# Patient Record
Sex: FEMALE | Race: WHITE | NOT HISPANIC OR LATINO | Employment: UNEMPLOYED | URBAN - METROPOLITAN AREA
[De-identification: names, ages, dates, MRNs, and addresses within clinical notes are randomized per-mention and may not be internally consistent; named-entity substitution may affect disease eponyms.]

---

## 2023-03-08 PROBLEM — K20.0 EOSINOPHILIC ESOPHAGITIS DUE TO FOOD: Status: ACTIVE | Noted: 2023-03-08

## 2023-03-08 PROBLEM — L30.9 ECZEMA: Status: ACTIVE | Noted: 2023-03-08

## 2023-03-08 PROBLEM — N94.6 DYSMENORRHEA: Status: ACTIVE | Noted: 2023-03-08

## 2023-03-08 PROBLEM — F41.8 DEPRESSION WITH ANXIETY: Status: ACTIVE | Noted: 2023-03-08

## 2023-03-08 PROBLEM — J45.20 MILD INTERMITTENT ASTHMA WITHOUT COMPLICATION (HHS-HCC): Status: ACTIVE | Noted: 2023-03-08

## 2023-03-08 PROBLEM — G90.A POTS (POSTURAL ORTHOSTATIC TACHYCARDIA SYNDROME): Status: ACTIVE | Noted: 2023-03-08

## 2023-03-08 PROBLEM — F90.2 ADHD (ATTENTION DEFICIT HYPERACTIVITY DISORDER), COMBINED TYPE: Status: ACTIVE | Noted: 2023-03-08

## 2023-03-08 PROBLEM — S83.519A ACL (ANTERIOR CRUCIATE LIGAMENT) TEAR: Status: ACTIVE | Noted: 2023-03-08

## 2023-03-08 RX ORDER — UBIDECARENONE 30 MG
CAPSULE ORAL
COMMUNITY

## 2023-03-08 RX ORDER — MINERAL OIL
ENEMA (ML) RECTAL
COMMUNITY

## 2023-03-08 RX ORDER — ALBUTEROL SULFATE 90 UG/1
AEROSOL, METERED RESPIRATORY (INHALATION)
COMMUNITY
End: 2023-07-10 | Stop reason: WASHOUT

## 2023-03-08 RX ORDER — SERTRALINE HYDROCHLORIDE 100 MG/1
1 TABLET, FILM COATED ORAL EVERY MORNING
COMMUNITY
Start: 2020-01-07 | End: 2023-03-13

## 2023-03-08 RX ORDER — FLUTICASONE PROPIONATE 50 MCG
2 SPRAY, SUSPENSION (ML) NASAL DAILY PRN
COMMUNITY
Start: 2021-05-28

## 2023-03-08 RX ORDER — METHYLPHENIDATE HYDROCHLORIDE 27 MG/1
1 TABLET ORAL EVERY MORNING
COMMUNITY
Start: 2021-07-13 | End: 2023-07-10 | Stop reason: WASHOUT

## 2023-03-08 RX ORDER — EPINEPHRINE 0.3 MG/.3ML
INJECTION SUBCUTANEOUS
COMMUNITY

## 2023-03-12 NOTE — PROGRESS NOTES
"Mood Follow-up  Hermila Jackson is a 22 y.o. female  following up today for concerns about depression and anxiety.  She has been under my care for depression with anxiety.    She notes that she hurt her knee again.  She had an MRI and gets the results with her orthopedist in 2 days.  She is well known to him from her L ACL tear in the past.  She notes that she may be tired because of her knee.      In the interim, patient reports compliance with medication regimen .   Patient reports no side effects.  Patient reports symptoms have worsened  since last visit.  She is on sertraline 125 mg daily.    She feels like she feels anxious all the time and has a tendency to \"over think things.\"    She had a therapist and is on a wait list for a new one.  Tentative appt is set for April.    She is going to [place] for spring break in 3 days.    Depression Symptoms:  She notes that she had an episode of depressive feelings one day, but that has improved and she generally does not feel depressed.  There was not a clear trigger    Self Harm Symptoms:   Self Mutilation: never  Suicidal Ideation: never  Suicidal Intent: never  Suicide Attempt: never    Anxiety Symptoms: Excessive anxiety/worry, difficulty controlling worry, difficulty concentrating, fatigue, sleep changes    Associated Symptoms: None    Other Symptoms: None    Review of Systems: MS: L knee injury        PHYSICAL EXAM      ASSESSMENT AND PLAN  1. Depression with anxiety  sertraline (Zoloft) 100 mg tablet    sertraline (Zoloft) 50 mg tablet           Hermila is not in counseling now because provider availability . The plan is to increase dose of sertraline* to at 150 mg/day and resume therapy .    Recommendations were discussed and patient agrees to the above plan. Patient demonstrates understanding and acceptance of risks and benefits and plan.   Patient instructed to call if concerns and to follow up in clinic in 1 month(s). May return to clinic or call sooner if " significant side effects or concerns.  I spent minutes of a total visit time of >30 minutes (>50%) counseling, coordinating services and care plan.

## 2023-03-13 ENCOUNTER — TELEMEDICINE (OUTPATIENT)
Dept: PEDIATRICS | Facility: CLINIC | Age: 23
End: 2023-03-13
Payer: OTHER GOVERNMENT

## 2023-03-13 DIAGNOSIS — S89.92XS INJURY OF LEFT KNEE, SEQUELA: Primary | ICD-10-CM

## 2023-03-13 DIAGNOSIS — F41.8 DEPRESSION WITH ANXIETY: Primary | ICD-10-CM

## 2023-03-13 PROBLEM — S83.519A ACL (ANTERIOR CRUCIATE LIGAMENT) TEAR: Status: RESOLVED | Noted: 2023-03-08 | Resolved: 2023-03-13

## 2023-03-13 PROCEDURE — 99214 OFFICE O/P EST MOD 30 MIN: CPT | Performed by: PEDIATRICS

## 2023-03-13 RX ORDER — SERTRALINE HYDROCHLORIDE 25 MG/1
25 TABLET, FILM COATED ORAL DAILY
COMMUNITY
Start: 2022-10-11 | End: 2023-03-13

## 2023-03-13 RX ORDER — SERTRALINE HYDROCHLORIDE 100 MG/1
TABLET, FILM COATED ORAL
Qty: 30 TABLET | Refills: 1 | Status: SHIPPED | OUTPATIENT
Start: 2023-03-13 | End: 2023-04-05

## 2023-03-13 RX ORDER — SERTRALINE HYDROCHLORIDE 50 MG/1
TABLET, FILM COATED ORAL
Qty: 30 TABLET | Refills: 1 | Status: SHIPPED | OUTPATIENT
Start: 2023-03-13 | End: 2023-04-05

## 2023-03-13 NOTE — ASSESSMENT & PLAN NOTE
Anxiety inadequately treated on sertraline 125 mg  Increasing to 150 mg daily and restarting therapy  Follow-up with me in 1 month

## 2023-04-05 DIAGNOSIS — F41.8 DEPRESSION WITH ANXIETY: ICD-10-CM

## 2023-04-05 RX ORDER — SERTRALINE HYDROCHLORIDE 50 MG/1
TABLET, FILM COATED ORAL
Qty: 90 TABLET | Refills: 1 | Status: SHIPPED | OUTPATIENT
Start: 2023-04-05 | End: 2023-08-21

## 2023-04-05 RX ORDER — SERTRALINE HYDROCHLORIDE 100 MG/1
TABLET, FILM COATED ORAL
Qty: 90 TABLET | Refills: 1 | Status: SHIPPED | OUTPATIENT
Start: 2023-04-05 | End: 2023-07-10 | Stop reason: SDUPTHER

## 2023-07-10 ENCOUNTER — OFFICE VISIT (OUTPATIENT)
Dept: PEDIATRICS | Facility: CLINIC | Age: 23
End: 2023-07-10
Payer: OTHER GOVERNMENT

## 2023-07-10 VITALS
SYSTOLIC BLOOD PRESSURE: 111 MMHG | WEIGHT: 147 LBS | DIASTOLIC BLOOD PRESSURE: 68 MMHG | BODY MASS INDEX: 25.1 KG/M2 | HEART RATE: 56 BPM | HEIGHT: 64 IN

## 2023-07-10 DIAGNOSIS — F41.1 GAD (GENERALIZED ANXIETY DISORDER): Primary | ICD-10-CM

## 2023-07-10 PROBLEM — L30.9 ECZEMA: Status: RESOLVED | Noted: 2023-03-08 | Resolved: 2023-07-10

## 2023-07-10 PROBLEM — J45.20 MILD INTERMITTENT ASTHMA WITHOUT COMPLICATION (HHS-HCC): Status: RESOLVED | Noted: 2023-03-08 | Resolved: 2023-07-10

## 2023-07-10 PROCEDURE — 1036F TOBACCO NON-USER: CPT | Performed by: PEDIATRICS

## 2023-07-10 PROCEDURE — 99214 OFFICE O/P EST MOD 30 MIN: CPT | Performed by: PEDIATRICS

## 2023-07-10 RX ORDER — SERTRALINE HYDROCHLORIDE 100 MG/1
200 TABLET, FILM COATED ORAL DAILY
Qty: 90 TABLET | Refills: 1 | Status: SHIPPED | OUTPATIENT
Start: 2023-07-10 | End: 2023-08-21

## 2023-07-10 RX ORDER — ACETAMINOPHEN, DIPHENHYDRAMINE HCL, PHENYLEPHRINE HCL 325; 25; 5 MG/1; MG/1; MG/1
1 TABLET ORAL NIGHTLY PRN
COMMUNITY

## 2023-07-10 NOTE — PROGRESS NOTES
"Subjective   Patient ID: Hermila Jackson is a 22 y.o. female who is here for concern of Med Check.  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    Anxiety comes in \"waves\" - near panic attacks have been occurring  Triggers include inadequate hydration, nutrition, or sleep, as well as overstimulation, but it can come without triggers.  She may ruminate on thoughts  It can occur without triggers.  She denies feeling depressed.  No side effects have been noted on the medication.    She quit taking Concerta since she quit taking exams.  She graduated from college 5/2023.  She is moving to [place] (working at [employer]) and plans to get a new doctor there.        Objective   Blood pressure 111/68, pulse 56, height 1.635 m (5' 4.38\"), weight 66.7 kg (147 lb).  Physical Exam  Constitutional:       General: She is not in acute distress.     Appearance: Normal appearance. She is normal weight.   HENT:      Mouth/Throat:      Mouth: Mucous membranes are moist.   Eyes:      Conjunctiva/sclera: Conjunctivae normal.   Neck:      Thyroid: No thyroid mass, thyromegaly or thyroid tenderness.   Cardiovascular:      Rate and Rhythm: Normal rate and regular rhythm.      Heart sounds: Normal heart sounds. No murmur heard.     No gallop.   Pulmonary:      Effort: Pulmonary effort is normal.      Breath sounds: Normal breath sounds.   Musculoskeletal:      Cervical back: Neck supple.   Lymphadenopathy:      Cervical: No cervical adenopathy.   Psychiatric:         Attention and Perception: Attention normal.         Mood and Affect: Affect normal.         Speech: Speech normal.         Behavior: Behavior normal.         Thought Content: Thought content normal.         Assessment/Plan   Problem List Items Addressed This Visit       EMERY (generalized anxiety disorder) - Primary    Relevant Medications    sertraline (Zoloft) 100 mg tablet   Kerline's anxiety is inadequately controlled with current management.  I will increase her " from 150 mg to 200 mg each morning.  She will be moving to [place] in a month and plans to find a doctor there.  She can contact me with questions or concerns in the interim.

## 2023-08-19 DIAGNOSIS — F41.1 GAD (GENERALIZED ANXIETY DISORDER): ICD-10-CM

## 2023-08-21 RX ORDER — SERTRALINE HYDROCHLORIDE 100 MG/1
200 TABLET, FILM COATED ORAL DAILY
Qty: 180 TABLET | Refills: 1 | Status: SHIPPED | OUTPATIENT
Start: 2023-08-21

## 2025-04-28 ENCOUNTER — APPOINTMENT (OUTPATIENT)
Dept: LAB | Facility: CLINIC | Age: 25
End: 2025-04-28
Payer: OTHER GOVERNMENT